# Patient Record
Sex: MALE | Race: WHITE | NOT HISPANIC OR LATINO | URBAN - NONMETROPOLITAN AREA
[De-identification: names, ages, dates, MRNs, and addresses within clinical notes are randomized per-mention and may not be internally consistent; named-entity substitution may affect disease eponyms.]

---

## 2019-05-22 ENCOUNTER — IMPORTED ENCOUNTER (OUTPATIENT)
Dept: URBAN - NONMETROPOLITAN AREA CLINIC 1 | Facility: CLINIC | Age: 59
End: 2019-05-22

## 2019-05-22 PROBLEM — G43.109: Noted: 2019-05-22

## 2019-05-22 PROBLEM — H40.013: Noted: 2018-11-14

## 2019-05-22 PROCEDURE — 99213 OFFICE O/P EST LOW 20 MIN: CPT

## 2019-05-22 NOTE — PATIENT DISCUSSION
POAG Suspect OU****Color photos deceiving on photos vs clinical- IOP OU adequately controlled without medication. Recommend continue to manage as a glaucoma suspect. - Recommend follow-up in 6 months with OCT-RNFL and VF 24-2. Migraines with Aura- No treatment inidcated. - Explained to patient that he may want to keep a log when they occur to see if he can find the root source of what may cause the migraines. (log food diet and activities prior to migraine occurring); 's Notes: Color photos deceiving on photos vs clinical

## 2019-12-11 ENCOUNTER — IMPORTED ENCOUNTER (OUTPATIENT)
Dept: URBAN - NONMETROPOLITAN AREA CLINIC 1 | Facility: CLINIC | Age: 59
End: 2019-12-11

## 2019-12-11 PROCEDURE — 92133 CPTRZD OPH DX IMG PST SGM ON: CPT

## 2019-12-11 PROCEDURE — 92083 EXTENDED VISUAL FIELD XM: CPT

## 2019-12-11 PROCEDURE — 99214 OFFICE O/P EST MOD 30 MIN: CPT

## 2019-12-11 NOTE — PATIENT DISCUSSION
POAG Suspect OU****Color photos deceiving on photos vs clinical- IOP OU adequately controlled without medication. Recommend continue to manage as a glaucoma suspect. - Recommend OCT today and shows vert CDR 0.57/0.53; normal neural rim and no loss of RNFL OU- Recommend VF today and shows no glaucomatous change no defects OU- Recommend follow-up in 6 months with OCT-RNFL (do not need to follow-up with VF's per Dr. Florentino Kenyon only OCT's)Migraines with Aura- No treatment inidcated. - Explained to patient that he may want to keep a log when they occur to see if he can find the root source of what may cause the migraines. (log food diet and activities prior to migraine occurring); Dr's Notes: Color photos deceiving on photos vs clinical 12/11/19 - OCT Vert CDR 0.57/0.53; normal neural rim and no loss of RNFL OU- VF today shows no glaucomatous change no defects OU- (do not need to follow-up with VF's per Dr. Florentino Kenyon only OCT's)

## 2020-01-09 ENCOUNTER — IMPORTED ENCOUNTER (OUTPATIENT)
Dept: URBAN - NONMETROPOLITAN AREA CLINIC 1 | Facility: CLINIC | Age: 60
End: 2020-01-09

## 2020-01-09 PROBLEM — H40.013: Noted: 2020-01-09

## 2020-01-09 PROBLEM — H10.13: Noted: 2020-01-09

## 2020-01-09 PROBLEM — G43.109: Noted: 2020-01-09

## 2020-01-09 PROCEDURE — 99212 OFFICE O/P EST SF 10 MIN: CPT

## 2020-01-09 NOTE — PATIENT DISCUSSION
Conjunctivitis OU- Recommend begin [place]-Poly-Dex Sol. 1 gtt TID OU- Recommend follow-up PRN***************************Previous Exam NOtes*********************POAG Suspect OU****Color photos deceiving on photos vs clinical- IOP OU adequately controlled without medication. Recommend continue to manage as a glaucoma suspect. - Recommend OCT today and shows vert CDR 0.57/0.53; normal neural rim and no loss of RNFL OU- Recommend VF today and shows no glaucomatous change no defects OU- Recommend follow-up in 6 months with OCT-RNFL (do not need to follow-up with VF's per Dr. Lacey Estimable only OCT's)Migraines with Aura- No treatment inidcated. - Explained to patient that he may want to keep a log when they occur to see if he can find the root source of what may cause the migraines. (log food diet and activities prior to migraine occurring); Dr's Notes: Color photos deceiving on photos vs clinical 12/11/19 - OCT Vert CDR 0.57/0.53; normal neural rim and no loss of RNFL OU- VF today shows no glaucomatous change no defects OU- (do not need to follow-up with VF's per Dr. Lacey Estimable only OCT's)

## 2020-06-24 ENCOUNTER — IMPORTED ENCOUNTER (OUTPATIENT)
Dept: URBAN - NONMETROPOLITAN AREA CLINIC 1 | Facility: CLINIC | Age: 60
End: 2020-06-24

## 2020-06-24 PROBLEM — H52.03: Noted: 2020-06-24

## 2020-06-24 PROBLEM — H52.4: Noted: 2020-06-24

## 2020-06-24 PROBLEM — H52.223: Noted: 2020-06-24

## 2020-06-24 PROBLEM — H40.013: Noted: 2020-06-24

## 2020-06-24 PROBLEM — G43.109: Noted: 2020-06-24

## 2020-06-24 PROCEDURE — 92015 DETERMINE REFRACTIVE STATE: CPT

## 2020-06-24 PROCEDURE — 99214 OFFICE O/P EST MOD 30 MIN: CPT

## 2020-06-24 NOTE — PATIENT DISCUSSION
POAG Suspect OU****Color photos deceiving on photos vs clinical- IOP OD 13 and 12 OS adequately controlled without medication. Recommend continue to manage as a glaucoma suspect. -OCT done previously and shows vert CDR 0.57/0.53; normal neural rim and no loss of RNFL OU-Vf done previously and shows no glaucomatous change no defects OU-**do not need to follow-up ith VF's per Dr. Monica Bautista only OCT's**Migraines with Aura- No treatment inidcated. - Explained to patient that he may want to keep a log when they occur to see if he can find the root source of what may cause the migraines. (log food diet and activities prior to migraine occurring)Presbyopia/Hyperpia./Astigmatism-Discussed refractive status with patient in detail -MR done today and new GLS RX given -Continue to monitor; Dr's Notes: Color photos deceiving on photos vs clinical 12/11/19 - OCT Vert CDR 0.57/0.53; normal neural rim and no loss of RNFL OU- VF today shows no glaucomatous change no defects OU- (do not need to follow-up with VF's per Dr. Monica Bautista only OCT's)

## 2021-01-06 ENCOUNTER — IMPORTED ENCOUNTER (OUTPATIENT)
Dept: URBAN - NONMETROPOLITAN AREA CLINIC 1 | Facility: CLINIC | Age: 61
End: 2021-01-06

## 2021-01-06 PROCEDURE — 99214 OFFICE O/P EST MOD 30 MIN: CPT

## 2021-01-06 PROCEDURE — 92133 CPTRZD OPH DX IMG PST SGM ON: CPT

## 2021-01-06 NOTE — PATIENT DISCUSSION
POAG Suspect OU****Color photos deceiving on photos vs clinical- IOP OD 13 and 12 OS adequately controlled without medication. Recommend continue to manage as a glaucoma suspect. -OCT done previously and shows vert CDR 0.57/0.53; normal neural rim and no loss of RNFL OU-VF done previously and shows no glaucomatous change no defects OU-Recommend OCT RNFL at annual intervals-**do not need to follow-up ith VF's per Dr. Dion Thompson only OCT's**Migraines with Aura- No treatment inidcated. - Explained to patient that he may want to keep a log when they occur to see if he can find the root source of what may cause the migraines. (log food diet and activities prior to migraine occurring)Presbyopia/Hyperpia/Astigmatism-Discussed refractive status with patient in detail -Continue to monitor; Dr's Notes: Color photos deceiving on photos vs clinical 1/06/21 - OCT Vert CDR 0.56/0.57; normal neural rim and no loss of RNFL OU12/11/19 - OCT Vert CDR 0.57/0.53; normal neural rim and no loss of RNFL OU- VF today shows no glaucomatous change no defects OU- (do not need to follow-up with VF's per Dr. Dion Thompson only OCT's)

## 2021-09-02 ENCOUNTER — IMPORTED ENCOUNTER (OUTPATIENT)
Dept: URBAN - NONMETROPOLITAN AREA CLINIC 1 | Facility: CLINIC | Age: 61
End: 2021-09-02

## 2021-09-02 ENCOUNTER — PREPPED CHART (OUTPATIENT)
Dept: RURAL CLINIC 3 | Facility: CLINIC | Age: 61
End: 2021-09-02

## 2021-09-02 PROBLEM — H52.03: Noted: 2021-09-02

## 2021-09-02 PROBLEM — H52.4: Noted: 2021-09-02

## 2021-09-02 PROBLEM — H40.013: Noted: 2021-09-02

## 2021-09-02 PROBLEM — H52.223: Noted: 2021-09-02

## 2021-09-02 PROCEDURE — 99214 OFFICE O/P EST MOD 30 MIN: CPT

## 2021-09-02 PROCEDURE — 92015 DETERMINE REFRACTIVE STATE: CPT

## 2021-09-02 PROCEDURE — 92250 FUNDUS PHOTOGRAPHY W/I&R: CPT

## 2021-09-02 NOTE — PATIENT DISCUSSION
(Low Risk) Patient is considered low risk. Condition was discussed with patient and patient understands. Will continue to monitor patient for any progression in condition. Patient was advised to call us with any problems, questions, or concerns.

## 2021-09-02 NOTE — PATIENT DISCUSSION
Glaucoma Suspect OUDiscussed diagnosis in detail with patient. No family history of disease. Discussed the chronic progressive nature of this disease and various treatment options. Photos done today; stable from previous. IOP at 14 OU. Cup to disc noted at 0.55 OD and 0.65 OS. Continue to monitor. RTC in 6 months with OCT and VF 24-2Hyperopia/Astigmatism/Presbyopia OUDiscussed refractive status in detail with patient. New glasses Rx given today. Continue to monitor.; Dr's Notes: **Family history of ARMD** (mother)MR  9/2/21DFE  9/2/21VF OCTPHOTOS  9/2/21GONIO

## 2022-03-03 ENCOUNTER — ESTABLISHED PATIENT (OUTPATIENT)
Dept: RURAL CLINIC 3 | Facility: CLINIC | Age: 62
End: 2022-03-03

## 2022-03-03 DIAGNOSIS — H40.013: ICD-10-CM

## 2022-03-03 PROCEDURE — 92083 EXTENDED VISUAL FIELD XM: CPT

## 2022-03-03 PROCEDURE — 92133 CPTRZD OPH DX IMG PST SGM ON: CPT

## 2022-03-03 PROCEDURE — 99214 OFFICE O/P EST MOD 30 MIN: CPT

## 2022-03-03 ASSESSMENT — VISUAL ACUITY
OD_CC: 20/15-1
OS_CC: 20/15

## 2022-03-03 ASSESSMENT — TONOMETRY
OS_IOP_MMHG: 15
OD_IOP_MMHG: 15

## 2022-04-15 ASSESSMENT — TONOMETRY
OS_IOP_MMHG: 12
OD_IOP_MMHG: 14
OS_IOP_MMHG: 14
OS_IOP_MMHG: 14
OD_IOP_MMHG: 13
OS_IOP_MMHG: 14
OS_IOP_MMHG: 14
OS_IOP_MMHG: 13
OD_IOP_MMHG: 14
OD_IOP_MMHG: 13

## 2022-04-15 ASSESSMENT — VISUAL ACUITY
OD_SC: 20/20
OS_SC: 20/20+
OS_SC: 20/15-
OS_SC: 20/20
OS_SC: 20/20
OD_SC: 20/20
OD_SC: 20/20-1
OD_SC: 20/20
OD_SC: 20/20
OS_SC: 20/20
OS_SC: 20/20
OD_SC: 20/20

## 2022-09-06 ENCOUNTER — FOLLOW UP (OUTPATIENT)
Dept: RURAL CLINIC 3 | Facility: CLINIC | Age: 62
End: 2022-09-06

## 2022-09-06 DIAGNOSIS — H40.013: ICD-10-CM

## 2022-09-06 DIAGNOSIS — H25.13: ICD-10-CM

## 2022-09-06 PROCEDURE — 92133 CPTRZD OPH DX IMG PST SGM ON: CPT

## 2022-09-06 PROCEDURE — 99214 OFFICE O/P EST MOD 30 MIN: CPT

## 2022-09-06 ASSESSMENT — TONOMETRY
OD_IOP_MMHG: 09
OS_IOP_MMHG: 09

## 2022-09-06 ASSESSMENT — VISUAL ACUITY
OD_CC: 20/20-2
OS_CC: 20/25+1

## 2023-09-19 ENCOUNTER — FOLLOW UP (OUTPATIENT)
Dept: RURAL CLINIC 3 | Facility: CLINIC | Age: 63
End: 2023-09-19

## 2023-09-19 DIAGNOSIS — H52.4: ICD-10-CM

## 2023-09-19 PROCEDURE — 92015 DETERMINE REFRACTIVE STATE: CPT

## 2023-09-19 PROCEDURE — 92014 COMPRE OPH EXAM EST PT 1/>: CPT

## 2023-09-19 ASSESSMENT — VISUAL ACUITY
OD_CC: 20/20
OS_CC: 20/15

## 2023-09-19 ASSESSMENT — TONOMETRY
OS_IOP_MMHG: 18
OD_IOP_MMHG: 18

## 2024-06-07 ENCOUNTER — FOLLOW UP (OUTPATIENT)
Dept: RURAL CLINIC 3 | Facility: CLINIC | Age: 64
End: 2024-06-07

## 2024-06-07 DIAGNOSIS — H40.013: ICD-10-CM

## 2024-06-07 DIAGNOSIS — H25.13: ICD-10-CM

## 2024-06-07 PROCEDURE — 92133 CPTRZD OPH DX IMG PST SGM ON: CPT

## 2024-06-07 PROCEDURE — 99214 OFFICE O/P EST MOD 30 MIN: CPT

## 2024-06-07 ASSESSMENT — VISUAL ACUITY
OU_CC: 20/20
OD_CC: 20/20
OS_CC: 20/20

## 2024-06-07 ASSESSMENT — TONOMETRY
OD_IOP_MMHG: 18
OS_IOP_MMHG: 17

## 2024-12-11 ENCOUNTER — FOLLOW UP (OUTPATIENT)
Age: 64
End: 2024-12-11

## 2024-12-11 DIAGNOSIS — H40.013: ICD-10-CM

## 2024-12-11 DIAGNOSIS — H25.13: ICD-10-CM

## 2024-12-11 DIAGNOSIS — H52.4: ICD-10-CM

## 2024-12-11 PROCEDURE — 92014 COMPRE OPH EXAM EST PT 1/>: CPT

## 2024-12-11 PROCEDURE — 92015 DETERMINE REFRACTIVE STATE: CPT

## 2024-12-11 PROCEDURE — 92250 FUNDUS PHOTOGRAPHY W/I&R: CPT

## 2025-06-11 ENCOUNTER — FOLLOW UP (OUTPATIENT)
Age: 65
End: 2025-06-11

## 2025-06-11 DIAGNOSIS — H40.013: ICD-10-CM

## 2025-06-11 DIAGNOSIS — H25.13: ICD-10-CM

## 2025-06-11 PROCEDURE — 92133 CPTRZD OPH DX IMG PST SGM ON: CPT

## 2025-06-11 PROCEDURE — 99214 OFFICE O/P EST MOD 30 MIN: CPT

## 2025-06-11 PROCEDURE — 92083 EXTENDED VISUAL FIELD XM: CPT
